# Patient Record
Sex: FEMALE | Race: WHITE | NOT HISPANIC OR LATINO | Employment: STUDENT | URBAN - METROPOLITAN AREA
[De-identification: names, ages, dates, MRNs, and addresses within clinical notes are randomized per-mention and may not be internally consistent; named-entity substitution may affect disease eponyms.]

---

## 2023-06-19 PROBLEM — M08.00 JUVENILE RHEUMATOID ARTHRITIS (MULTI): Status: ACTIVE | Noted: 2023-06-19

## 2023-06-19 PROBLEM — J30.9 ALLERGIC RHINITIS: Status: ACTIVE | Noted: 2023-06-19

## 2023-06-19 PROBLEM — M25.552 LEFT HIP PAIN: Status: ACTIVE | Noted: 2023-06-19

## 2023-06-19 PROBLEM — L73.2 HIDRADENITIS SUPPURATIVA: Status: ACTIVE | Noted: 2023-06-19

## 2023-06-19 PROBLEM — L73.9 FOLLICULITIS OF AXILLA: Status: RESOLVED | Noted: 2023-06-19 | Resolved: 2023-06-19

## 2023-06-19 PROBLEM — L73.9 FOLLICULITIS OF AXILLA: Status: ACTIVE | Noted: 2023-06-19

## 2023-06-19 PROBLEM — N64.89 BREAST ASYMMETRY: Status: ACTIVE | Noted: 2023-06-19

## 2023-06-19 PROBLEM — B35.1 ONYCHOMYCOSIS OF TOENAIL: Status: ACTIVE | Noted: 2023-06-19

## 2023-06-19 PROBLEM — M87.052 AVASCULAR NECROSIS OF BONE OF LEFT HIP (MULTI): Status: ACTIVE | Noted: 2023-06-19

## 2023-06-19 PROBLEM — Z91.018 H/O FOOD ALLERGY: Status: ACTIVE | Noted: 2023-06-19

## 2023-06-19 PROBLEM — R29.898 LEG WEAKNESS: Status: ACTIVE | Noted: 2023-06-19

## 2023-06-19 RX ORDER — CELECOXIB 200 MG/1
200 CAPSULE ORAL 2 TIMES DAILY
COMMUNITY
Start: 2023-02-20 | End: 2023-08-25

## 2023-06-19 RX ORDER — ACETAMINOPHEN 325 MG/1
325 TABLET ORAL EVERY 4 HOURS PRN
COMMUNITY

## 2023-06-19 RX ORDER — DOXYCYCLINE 100 MG/1
1 TABLET ORAL DAILY
COMMUNITY
Start: 2022-08-17 | End: 2023-06-20 | Stop reason: WASHOUT

## 2023-06-19 RX ORDER — DROSPIRENONE, ETHINYL ESTRADIOL AND LEVOMEFOLATE CALCIUM AND LEVOMEFOLATE CALCIUM 3-0.02(24)
1 KIT ORAL DAILY
COMMUNITY
End: 2023-06-23

## 2023-06-19 RX ORDER — MULTIVITAMIN
1 TABLET ORAL DAILY
COMMUNITY

## 2023-06-19 RX ORDER — METHYLPREDNISOLONE 4 MG/1
4 TABLET ORAL ONCE
COMMUNITY
Start: 2022-08-26 | End: 2023-06-20 | Stop reason: WASHOUT

## 2023-06-19 RX ORDER — DICLOFENAC SODIUM 75 MG/1
1 TABLET, DELAYED RELEASE ORAL 2 TIMES DAILY
COMMUNITY
Start: 2022-05-18 | End: 2023-06-20 | Stop reason: WASHOUT

## 2023-06-19 NOTE — PATIENT INSTRUCTIONS
Thank you for choosing [facility] for your healthcare.   As always if you have any questions or concerns please do not hesitate to call our office at [phone] or through [employer].    Have a great day!  Liz Niño, DO    Asc Procedure Text (A): After obtaining clear surgical margins the patient was sent to an ASC for surgical repair.  The patient understands they will receive post-surgical care and follow-up from the ASC physician.

## 2023-06-19 NOTE — PROGRESS NOTES
"Subjective   Patient ID: Yessi Moore is a 22 y.o. female who presents for Follow-up (Hip replacement in Feb) and Annual Exam.  HPI    In addition to that documented in the HPI above, the additional ROS was obtained:  Constitutional: Denies fevers or chills  Eyes: Denies vision changes  ENMT: Denies trouble swallowing  Cardiovascular: Denies chest pain or heart racing  Respiratory: Denies SOB or cough  Gastrointestinal: Denies nausea, vomiting, diarrhea or constipation      Current Outpatient Medications   Medication Sig Dispense Refill    acetaminophen (Tylenol) 325 mg tablet Take 1 tablet (325 mg) by mouth every 4 hours if needed for mild pain (1 - 3) or moderate pain (4 - 6).      ASCORBIC ACID, VITAMIN C, ORAL Take by mouth.      celecoxib (CeleBREX) 200 mg capsule Take 1 capsule (200 mg) by mouth twice a day.      CHOLECALCIFEROL, VITAMIN D3, ORAL Take 1 tablet by mouth once daily.      drospirenone-e.estradioL-lm.FA (Tiffani Julio) 3-0.02-0.451 mg (24) (4) Take 1 tablet by mouth once daily.      fish oil concentrate (Omega-3) 120-180 mg capsule Take 1 capsule (1 g) by mouth once daily.      Kevzara 200 mg/1.14 mL injection Inject 1.14 mL (200 mg) under the skin every 14 (fourteen) days.      multivitamin tablet Take 1 tablet by mouth once daily.       No current facility-administered medications for this visit.       Objective     Visit Vitals  /74 (BP Location: Right arm, Patient Position: Sitting, BP Cuff Size: Adult)   Pulse 76   Temp 35.9 °C (96.7 °F) (Temporal)   Resp 16   Ht 1.664 m (5' 5.5\")   Wt 68 kg (150 lb)   SpO2 99%   BMI 24.58 kg/m²   Smoking Status Never   BSA 1.77 m²        Constitutional: Well nourished, well developed, appears stated age  Eyes: no scleral icterus, no conjunctival injection  Ears: normal external auditory canal, no retraction or bulging of tympanic membranes  Neck: no thyromegaly  Cardiovascular: regular rate and rhythm, no leg edema  Respiratory: normal respiratory " effort, clear to auscultation bilaterally  Musculoskeletal: No gross deformities appreciated  Skin: Warm, dry. No rashes  Neurologic: Alert, CNs II-XII grossly intact..  Psych: Appropriate mood and affect.        Assessment/Plan   Problem List Items Addressed This Visit       Juvenile rheumatoid arthritis (CMS/HCC)     Started on Kevzara biweekly injections by rheumatology          Avascular necrosis of bone of left hip (CMS/HCC)     S/p hip replacement in February  Fully recovered          Other Visit Diagnoses       Annual physical exam    -  Primary    Preventative care up to date          Follow up yearly and prn.    Virginia Factor, DO

## 2023-06-20 ENCOUNTER — OFFICE VISIT (OUTPATIENT)
Dept: PRIMARY CARE | Facility: CLINIC | Age: 23
End: 2023-06-20
Payer: COMMERCIAL

## 2023-06-20 VITALS
DIASTOLIC BLOOD PRESSURE: 74 MMHG | HEIGHT: 66 IN | WEIGHT: 150 LBS | HEART RATE: 76 BPM | OXYGEN SATURATION: 99 % | SYSTOLIC BLOOD PRESSURE: 122 MMHG | TEMPERATURE: 96.7 F | RESPIRATION RATE: 16 BRPM | BODY MASS INDEX: 24.11 KG/M2

## 2023-06-20 DIAGNOSIS — M08.00 JUVENILE RHEUMATOID ARTHRITIS (MULTI): ICD-10-CM

## 2023-06-20 DIAGNOSIS — Z00.00 ANNUAL PHYSICAL EXAM: Primary | ICD-10-CM

## 2023-06-20 DIAGNOSIS — M87.052 AVASCULAR NECROSIS OF BONE OF LEFT HIP (MULTI): ICD-10-CM

## 2023-06-20 PROBLEM — Z96.642 S/P TOTAL LEFT HIP ARTHROPLASTY: Status: ACTIVE | Noted: 2023-02-14

## 2023-06-20 PROBLEM — R29.898 LEG WEAKNESS: Status: RESOLVED | Noted: 2023-06-19 | Resolved: 2023-06-20

## 2023-06-20 PROBLEM — M25.552 LEFT HIP PAIN: Status: RESOLVED | Noted: 2023-06-19 | Resolved: 2023-06-20

## 2023-06-20 PROCEDURE — 1036F TOBACCO NON-USER: CPT | Performed by: FAMILY MEDICINE

## 2023-06-20 PROCEDURE — 99395 PREV VISIT EST AGE 18-39: CPT | Performed by: FAMILY MEDICINE

## 2023-06-20 RX ORDER — SARILUMAB 200 MG/1.14ML
200 INJECTION, SOLUTION SUBCUTANEOUS
COMMUNITY
Start: 2023-06-13

## 2023-06-20 RX ORDER — ACETAMINOPHEN 500 MG
1000 TABLET ORAL 3 TIMES DAILY
COMMUNITY
Start: 2023-02-15 | End: 2023-06-20 | Stop reason: WASHOUT

## 2023-06-20 RX ORDER — TRETINOIN 0.5 MG/G
CREAM TOPICAL
COMMUNITY
Start: 2019-08-22 | End: 2023-06-20 | Stop reason: WASHOUT

## 2023-06-20 SDOH — ECONOMIC STABILITY: FOOD INSECURITY: WITHIN THE PAST 12 MONTHS, THE FOOD YOU BOUGHT JUST DIDN'T LAST AND YOU DIDN'T HAVE MONEY TO GET MORE.: NEVER TRUE

## 2023-06-20 SDOH — ECONOMIC STABILITY: FOOD INSECURITY: WITHIN THE PAST 12 MONTHS, YOU WORRIED THAT YOUR FOOD WOULD RUN OUT BEFORE YOU GOT MONEY TO BUY MORE.: NEVER TRUE

## 2023-06-20 ASSESSMENT — PATIENT HEALTH QUESTIONNAIRE - PHQ9
SUM OF ALL RESPONSES TO PHQ9 QUESTIONS 1 & 2: 0
2. FEELING DOWN, DEPRESSED OR HOPELESS: NOT AT ALL
1. LITTLE INTEREST OR PLEASURE IN DOING THINGS: NOT AT ALL

## 2023-06-20 ASSESSMENT — LIFESTYLE VARIABLES
SKIP TO QUESTIONS 9-10: 1
HOW MANY STANDARD DRINKS CONTAINING ALCOHOL DO YOU HAVE ON A TYPICAL DAY: PATIENT DOES NOT DRINK
HOW OFTEN DO YOU HAVE SIX OR MORE DRINKS ON ONE OCCASION: NEVER
AUDIT-C TOTAL SCORE: 0
HOW OFTEN DO YOU HAVE A DRINK CONTAINING ALCOHOL: NEVER

## 2023-06-20 ASSESSMENT — PAIN SCALES - GENERAL: PAINLEVEL: 0-NO PAIN

## 2023-08-01 LAB
CHLAMYDIA TRACH., AMPLIFIED: NEGATIVE
N. GONORRHEA, AMPLIFIED: NEGATIVE

## 2023-08-25 DIAGNOSIS — M08.00: ICD-10-CM

## 2023-08-25 PROBLEM — L73.9 FOLLICULITIS OF AXILLA: Status: ACTIVE | Noted: 2023-08-25

## 2023-08-25 PROBLEM — L30.9 ECZEMA: Status: ACTIVE | Noted: 2023-08-25

## 2023-08-25 RX ORDER — CELECOXIB 200 MG/1
200 CAPSULE ORAL 2 TIMES DAILY
Qty: 180 CAPSULE | Refills: 1 | Status: SHIPPED | OUTPATIENT
Start: 2023-08-25 | End: 2024-03-04

## 2024-03-04 DIAGNOSIS — M08.00: ICD-10-CM

## 2024-03-04 RX ORDER — CELECOXIB 200 MG/1
200 CAPSULE ORAL 2 TIMES DAILY
Qty: 180 CAPSULE | Refills: 1 | Status: SHIPPED | OUTPATIENT
Start: 2024-03-04

## 2024-06-24 ENCOUNTER — APPOINTMENT (OUTPATIENT)
Dept: PRIMARY CARE | Facility: CLINIC | Age: 24
End: 2024-06-24
Payer: COMMERCIAL

## 2024-07-10 ENCOUNTER — APPOINTMENT (OUTPATIENT)
Dept: PRIMARY CARE | Facility: CLINIC | Age: 24
End: 2024-07-10
Payer: COMMERCIAL

## 2024-07-24 ENCOUNTER — APPOINTMENT (OUTPATIENT)
Dept: PRIMARY CARE | Facility: CLINIC | Age: 24
End: 2024-07-24
Payer: COMMERCIAL

## 2024-07-24 VITALS
WEIGHT: 156 LBS | HEART RATE: 74 BPM | HEIGHT: 65 IN | DIASTOLIC BLOOD PRESSURE: 73 MMHG | BODY MASS INDEX: 25.99 KG/M2 | OXYGEN SATURATION: 97 % | SYSTOLIC BLOOD PRESSURE: 105 MMHG | TEMPERATURE: 96.8 F | RESPIRATION RATE: 18 BRPM

## 2024-07-24 DIAGNOSIS — Z00.00 ANNUAL PHYSICAL EXAM: Primary | ICD-10-CM

## 2024-07-24 PROCEDURE — 1036F TOBACCO NON-USER: CPT | Performed by: FAMILY MEDICINE

## 2024-07-24 PROCEDURE — 3008F BODY MASS INDEX DOCD: CPT | Performed by: FAMILY MEDICINE

## 2024-07-24 PROCEDURE — 99395 PREV VISIT EST AGE 18-39: CPT | Performed by: FAMILY MEDICINE

## 2024-07-24 RX ORDER — ETANERCEPT 50 MG/ML
50 SOLUTION SUBCUTANEOUS
COMMUNITY
Start: 2024-04-26

## 2024-07-24 SDOH — ECONOMIC STABILITY: FOOD INSECURITY: WITHIN THE PAST 12 MONTHS, THE FOOD YOU BOUGHT JUST DIDN'T LAST AND YOU DIDN'T HAVE MONEY TO GET MORE.: NEVER TRUE

## 2024-07-24 SDOH — ECONOMIC STABILITY: FOOD INSECURITY: WITHIN THE PAST 12 MONTHS, YOU WORRIED THAT YOUR FOOD WOULD RUN OUT BEFORE YOU GOT MONEY TO BUY MORE.: NEVER TRUE

## 2024-07-24 ASSESSMENT — LIFESTYLE VARIABLES
AUDIT-C TOTAL SCORE: 0
SKIP TO QUESTIONS 9-10: 1
HOW MANY STANDARD DRINKS CONTAINING ALCOHOL DO YOU HAVE ON A TYPICAL DAY: PATIENT DOES NOT DRINK
HOW OFTEN DO YOU HAVE SIX OR MORE DRINKS ON ONE OCCASION: NEVER
HOW OFTEN DO YOU HAVE A DRINK CONTAINING ALCOHOL: NEVER

## 2024-07-24 ASSESSMENT — PATIENT HEALTH QUESTIONNAIRE - PHQ9
SUM OF ALL RESPONSES TO PHQ9 QUESTIONS 1 & 2: 0
1. LITTLE INTEREST OR PLEASURE IN DOING THINGS: NOT AT ALL
2. FEELING DOWN, DEPRESSED OR HOPELESS: NOT AT ALL

## 2024-07-24 ASSESSMENT — ENCOUNTER SYMPTOMS
OCCASIONAL FEELINGS OF UNSTEADINESS: 0
DEPRESSION: 0
LOSS OF SENSATION IN FEET: 0

## 2024-07-24 ASSESSMENT — PAIN SCALES - GENERAL: PAINLEVEL: 0-NO PAIN

## 2024-07-24 NOTE — PATIENT INSTRUCTIONS
Thank you for choosing [facility] for your healthcare.   As always if you have any questions or concerns please do not hesitate to call our office at [phone] or through [employer].    Have a great day!  Liz Niño, DO

## 2024-07-24 NOTE — PROGRESS NOTES
"Subjective   Patient ID: Yessi Moore is a 24 y.o. female who presents for Annual Exam.  Was switched to Enbrel from rheumatology a few months ago and is doing well. Has not had recurrent tonsillitis like she was on previous medication.         In addition to that documented in the HPI above, the additional ROS was obtained:  Constitutional: Denies fevers or chills  Eyes: Denies vision changes  ENMT: Denies trouble swallowing  Cardiovascular: Denies chest pain or heart racing  Respiratory: Denies SOB or cough  Gastrointestinal: Denies nausea, vomiting, diarrhea or constipation      Patient Care Team:  Liz Niño DO as PCP - General  Liz Niño DO as PCP - [facility] PCP  Jorge A Martini MD as Consulting Physician (Orthopaedic Surgery)  Karina Thompson MD as Consulting Physician (Rheumatology)    Current Outpatient Medications   Medication Sig Dispense Refill    acetaminophen (Tylenol) 325 mg tablet Take 1 tablet (325 mg) by mouth every 4 hours if needed for mild pain (1 - 3) or moderate pain (4 - 6).      ASCORBIC ACID, VITAMIN C, ORAL Take by mouth.      celecoxib (CeleBREX) 200 mg capsule TAKE 1 CAPSULE BY MOUTH TWICE A  capsule 1    CHOLECALCIFEROL, VITAMIN D3, ORAL Take 1 tablet by mouth once daily.      drospirenone-e.estradioL-lm.FA (Tiffani Julio) 3-0.02-0.451 mg (24) (4) Take 1 tablet by mouth once daily.      EnbreL SureClick 50 mg/mL (1 mL) pen injector pen injection Inject 1 mL (50 mg) under the skin 1 (one) time per week.      fish oil concentrate (Omega-3) 120-180 mg capsule Take 1 capsule (1 g) by mouth once daily.      multivitamin tablet Take 1 tablet by mouth once daily.       No current facility-administered medications for this visit.       Objective     Visit Vitals  /73 (BP Location: Right arm, Patient Position: Sitting, BP Cuff Size: Adult)   Pulse 74   Temp 36 °C (96.8 °F) (Temporal)   Resp 18   Ht 1.651 m (5' 5\")   Wt 70.8 kg (156 lb)   SpO2 97%   BMI 25.96 kg/m² "   Smoking Status Never   BSA 1.8 m²        Constitutional: Well nourished, well developed, appears stated age  Eyes: no scleral icterus, no conjunctival injection  Ears: normal external auditory canal, no retraction or bulging of tympanic membranes  Neck: no thyromegaly  Cardiovascular: regular rate and rhythm, no leg edema  Respiratory: normal respiratory effort, clear to auscultation bilaterally  Skin: Warm, dry. No rashes  Neurologic: Alert, CNs II-XII grossly intact..  Psych: Appropriate mood and affect.        Assessment/Plan   Problem List Items Addressed This Visit    None  Visit Diagnoses       Annual physical exam    -  Primary    declined Tdap and HPV vaccines   screening labs done previously          Follow up yearly and prn. Going back to school in the fall to get her masters.     Liz Niño, DO

## 2024-08-07 ENCOUNTER — APPOINTMENT (OUTPATIENT)
Dept: OBSTETRICS AND GYNECOLOGY | Facility: CLINIC | Age: 24
End: 2024-08-07
Payer: COMMERCIAL

## 2024-08-07 VITALS — WEIGHT: 155 LBS | DIASTOLIC BLOOD PRESSURE: 78 MMHG | BODY MASS INDEX: 25.79 KG/M2 | SYSTOLIC BLOOD PRESSURE: 100 MMHG

## 2024-08-07 DIAGNOSIS — Z11.3 SCREEN FOR STD (SEXUALLY TRANSMITTED DISEASE): ICD-10-CM

## 2024-08-07 DIAGNOSIS — Z12.4 SCREENING FOR MALIGNANT NEOPLASM OF CERVIX: ICD-10-CM

## 2024-08-07 DIAGNOSIS — Z30.41 ENCOUNTER FOR SURVEILLANCE OF CONTRACEPTIVE PILLS: ICD-10-CM

## 2024-08-07 DIAGNOSIS — Z01.419 ENCOUNTER FOR WELL WOMAN EXAM WITH ROUTINE GYNECOLOGICAL EXAM: Primary | ICD-10-CM

## 2024-08-07 PROCEDURE — 87491 CHLMYD TRACH DNA AMP PROBE: CPT

## 2024-08-07 PROCEDURE — 87591 N.GONORRHOEAE DNA AMP PROB: CPT

## 2024-08-07 PROCEDURE — 99395 PREV VISIT EST AGE 18-39: CPT | Performed by: NURSE PRACTITIONER

## 2024-08-07 PROCEDURE — 88175 CYTOPATH C/V AUTO FLUID REDO: CPT

## 2024-08-07 PROCEDURE — 1036F TOBACCO NON-USER: CPT | Performed by: NURSE PRACTITIONER

## 2024-08-07 PROCEDURE — 87661 TRICHOMONAS VAGINALIS AMPLIF: CPT

## 2024-08-07 RX ORDER — DROSPIRENONE, ETHINYL ESTRADIOL AND LEVOMEFOLATE CALCIUM AND LEVOMEFOLATE CALCIUM 3-0.02(24)
1 KIT ORAL DAILY
Qty: 84 TABLET | Refills: 3 | Status: SHIPPED | OUTPATIENT
Start: 2024-08-07

## 2024-08-07 NOTE — PROGRESS NOTES
HPI:   Yessi Moore is a 24 y.o. who presents today for her annual gynecologic exam without complaints    She has the following concerns; None. Doing well. Needs refills of her ocp. Periods are well managed on this pill. Due for PAP.     GYN HISTORY:  Periods are regular every 28-30 days, lasting 4 days.   Dysmenorrhea:none. Cyclic symptoms include none.   No intermenstrual bleeding, spotting, or discharge.    Current contraception: OCP (estrogen/progesterone)      Requests STD testing: yes -        PAP History   Last pap:   2023 ASCUS HPV Not done  History of abnormal pap: yes - hx of ASCUS  HPV vaccine: no  @paphx@    Health Screening  Family history of breast, uterine, ovarian or colon cancer: no         The patient feels safe at home.         Review of Systems:   Constitutional: no fever and no chills.  Cardiovascular: no chest pain.   Respiratory: no shortness of breath.   Gastrointestinal: no nausea, no abdominal pain and no constipation  Genitourinary: no dysuria, no urinary incontinence, no vaginal dryness, no pelvic pain and no vaginal discharge.   Neurological: no headache.  Psychiatric: no anxiety and no depression.              Objective         /78   Wt 70.3 kg (155 lb)   LMP 07/10/2024   BMI 25.79 kg/m²         Physical Exam:   Constitutional: Alert and in no acute distress. Well developed, well nourished.      Neck: No neck asymmetry. Supple. Thyroid not enlarged and there were no palpable thyroid nodules.      Cardiovascular: Heart rate and rhythm were normal, normal S1 and S2, no gallops, and no murmurs.      Pulmonary: No respiratory distress. Clear bilateral breath sounds.      Chest: Breasts: Normal appearance, no nipple discharge and no skin changes. Palpation of breasts and axillae: No palpable mass and no axillary lymphadenopathy.      Abdomen: Soft nontender; no abdominal mass palpated. Normal bowel sounds. No organomegaly.      Genitourinary:   - External genitalia: Normal.    - Palpation of lymph nodes in groin: No inguinal lymphadenopathy.   - Bartholin's Urethral and Skenes Glands: Normal.   - Urethra: Normal.    -Bladder: Normal on palpation.   - Vagina: Normal.   - Cervix: Normal.   - Uterus: Normal. Right Adnexa/parametria: Normal. Left Adnexa/parametria: Normal.   - Perianal Area: Normal.      Skin: Normal skin color and pigmentation, normal skin turgor, and no rash     Psychiatric: Alert and oriented x 3. Affect normal to patient baseline. Mood: Appropriate.            Assessment/Plan       Diagnoses and all orders for this visit:  Encounter for well woman exam with routine gynecological exam  Here for well woman exam. Doing well. PAP obtained. STD testing obtained.   Encounter for surveillance of contraceptive pills  -     drospirenone-e.estradioL-lm.FA (Tiffani Julio) 3-0.02-0.451 mg (24) (4); Take 1 tablet by mouth once daily.  Screen for STD (sexually transmitted disease)  Via thin prep  Screening for malignant neoplasm of cervix  Thinprep  Follow-up annually; sooner if needed.         ABEL Brooks-CNP

## 2024-08-08 LAB
C TRACH RRNA SPEC QL NAA+PROBE: NEGATIVE
N GONORRHOEA DNA SPEC QL PROBE+SIG AMP: NEGATIVE
T VAGINALIS RRNA SPEC QL NAA+PROBE: NEGATIVE

## 2024-08-20 DIAGNOSIS — N76.0 BV (BACTERIAL VAGINOSIS): Primary | ICD-10-CM

## 2024-08-20 DIAGNOSIS — B96.89 BV (BACTERIAL VAGINOSIS): Primary | ICD-10-CM

## 2024-08-20 LAB
CYTOLOGY CMNT CVX/VAG CYTO-IMP: NORMAL
LAB AP HPV GENOTYPE QUESTION: NO
LAB AP HPV HR: NORMAL
LAB AP PAP ADDITIONAL TESTS: NORMAL
LABORATORY COMMENT REPORT: NORMAL
LMP START DATE: NORMAL
PATH REPORT.TOTAL CANCER: NORMAL

## 2024-08-20 RX ORDER — METRONIDAZOLE 500 MG/1
500 TABLET ORAL 2 TIMES DAILY
Qty: 14 TABLET | Refills: 0 | Status: SHIPPED | OUTPATIENT
Start: 2024-08-20 | End: 2024-08-27

## 2024-12-27 ENCOUNTER — APPOINTMENT (OUTPATIENT)
Dept: OBSTETRICS AND GYNECOLOGY | Facility: CLINIC | Age: 24
End: 2024-12-27
Payer: COMMERCIAL

## 2024-12-27 VITALS — SYSTOLIC BLOOD PRESSURE: 100 MMHG | DIASTOLIC BLOOD PRESSURE: 70 MMHG

## 2024-12-27 DIAGNOSIS — Z32.02 URINE PREGNANCY TEST NEGATIVE: ICD-10-CM

## 2024-12-27 DIAGNOSIS — Z30.41 ENCOUNTER FOR SURVEILLANCE OF CONTRACEPTIVE PILLS: Primary | ICD-10-CM

## 2024-12-27 DIAGNOSIS — Z11.3 SCREENING FOR STD (SEXUALLY TRANSMITTED DISEASE): ICD-10-CM

## 2024-12-27 LAB — PREGNANCY TEST URINE, POC: NEGATIVE

## 2024-12-27 PROCEDURE — 87591 N.GONORRHOEAE DNA AMP PROB: CPT

## 2024-12-27 PROCEDURE — 81025 URINE PREGNANCY TEST: CPT | Performed by: NURSE PRACTITIONER

## 2024-12-27 PROCEDURE — 99213 OFFICE O/P EST LOW 20 MIN: CPT | Performed by: NURSE PRACTITIONER

## 2024-12-27 PROCEDURE — 87491 CHLMYD TRACH DNA AMP PROBE: CPT

## 2024-12-27 RX ORDER — NORETHINDRONE ACETATE AND ETHINYL ESTRADIOL, ETHINYL ESTRADIOL AND FERROUS FUMARATE 1MG-10(24)
1 KIT ORAL DAILY
Qty: 28 TABLET | Refills: 11 | Status: SHIPPED | OUTPATIENT
Start: 2024-12-27 | End: 2025-12-27

## 2024-12-27 NOTE — PROGRESS NOTES
Subjective   Patient ID: Yesis Moore is a 24 y.o. female who presents for Contraception (Reviewing  EMR/Last Annual Exam: 08/07/2024/Negative Pap 2024/).  HPI  Having irregular bleeding on her ocp. Every couple of months she is having bleeding outside of her period, usually the week before she starts. Bleeding can last about a week. Bleeding is light spotting. She is also having more breakouts with her skin. She is interested in trying a different pill.   She desires STD testing today. Will also obtain urine HCG.     Review of Systems   Genitourinary:  Positive for menstrual problem.   All other systems reviewed and are negative.      Objective   Physical Exam  Constitutional:       Appearance: Normal appearance.   Pulmonary:      Effort: Pulmonary effort is normal.   Neurological:      Mental Status: She is alert.   Psychiatric:         Mood and Affect: Mood normal.         Behavior: Behavior normal.       Assessment/Plan   Diagnoses and all orders for this visit:  Encounter for surveillance of contraceptive pills  -     norethindrone-e.estradioL-iron (Lo Loestrin Fe) 1 mg-10 mcg (24)/10 mcg (2) tablet; Take 1 tablet by mouth once daily.  She would like to change her pill to see if this may help regulate her bleeding. STD testing obtained today. Her urine HCG is negative.   Screen for STD  Negative pregnancy test   Follow-up in three months for med check; sooner if needed.     JOHNATHAN Brooks 12/27/24 10:58 AM

## 2024-12-28 LAB
C TRACH RRNA SPEC QL NAA+PROBE: NEGATIVE
N GONORRHOEA DNA SPEC QL PROBE+SIG AMP: NEGATIVE

## 2025-01-24 ENCOUNTER — OFFICE VISIT (OUTPATIENT)
Dept: OBSTETRICS AND GYNECOLOGY | Facility: CLINIC | Age: 25
End: 2025-01-24
Payer: COMMERCIAL

## 2025-01-24 VITALS — DIASTOLIC BLOOD PRESSURE: 70 MMHG | SYSTOLIC BLOOD PRESSURE: 110 MMHG

## 2025-01-24 DIAGNOSIS — Z30.41 ENCOUNTER FOR SURVEILLANCE OF CONTRACEPTIVE PILLS: Primary | ICD-10-CM

## 2025-01-24 PROCEDURE — 99213 OFFICE O/P EST LOW 20 MIN: CPT | Performed by: NURSE PRACTITIONER

## 2025-01-24 RX ORDER — DESOGESTREL AND ETHINYL ESTRADIOL 0.15-0.03
1 KIT ORAL DAILY
Qty: 28 TABLET | Refills: 11 | Status: SHIPPED | OUTPATIENT
Start: 2025-01-24 | End: 2026-01-24

## 2025-01-24 NOTE — PROGRESS NOTES
Subjective   Patient ID: Yessi Moore is a 24 y.o. female who presents for Follow-up (Medication-norethindrone-e.estradioL-iron (Lo Loestrin Fe) 1 mg-10 mcg (24)/10 mcg (2) tablet/Reviewing  EMR/Last Annual Exam: 12/27/2024/Negative Pap 2024).  HPI  Here with c/o hives. She started a new ocp on January 9th. On the 15th she started noticing some spots on her skin. Started on her trunk. Then spread to her chest and neck. Then became itchy. She stopped the pill last night. Benadryl is working okay to give her relief.   Review of Systems    Objective   Physical Exam    Assessment/Plan   {Assess/PlanSmartLinks:38873}         ABEL Brooks-CNP 01/24/25 2:59 PM

## 2025-02-20 ENCOUNTER — APPOINTMENT (OUTPATIENT)
Dept: OBSTETRICS AND GYNECOLOGY | Facility: CLINIC | Age: 25
End: 2025-02-20
Payer: COMMERCIAL

## 2025-04-18 ENCOUNTER — APPOINTMENT (OUTPATIENT)
Dept: OBSTETRICS AND GYNECOLOGY | Facility: CLINIC | Age: 25
End: 2025-04-18
Payer: COMMERCIAL

## 2025-07-23 NOTE — PATIENT INSTRUCTIONS
Thank you for choosing [facility] for your healthcare.   As always if you have any questions or concerns please do not hesitate to call our office at [phone] or through [employer].    Have a great day!  Liz Niño, DO

## 2025-07-24 ENCOUNTER — APPOINTMENT (OUTPATIENT)
Dept: PRIMARY CARE | Facility: CLINIC | Age: 25
End: 2025-07-24
Payer: COMMERCIAL

## 2025-07-24 VITALS
HEIGHT: 65 IN | OXYGEN SATURATION: 99 % | WEIGHT: 154 LBS | HEART RATE: 80 BPM | RESPIRATION RATE: 20 BRPM | TEMPERATURE: 96.8 F | SYSTOLIC BLOOD PRESSURE: 109 MMHG | BODY MASS INDEX: 25.66 KG/M2 | DIASTOLIC BLOOD PRESSURE: 76 MMHG

## 2025-07-24 DIAGNOSIS — L65.9 HAIR LOSS: ICD-10-CM

## 2025-07-24 DIAGNOSIS — Z79.899 OTHER LONG TERM (CURRENT) DRUG THERAPY: ICD-10-CM

## 2025-07-24 DIAGNOSIS — M08.00 JUVENILE RHEUMATOID ARTHRITIS (MULTI): ICD-10-CM

## 2025-07-24 DIAGNOSIS — Z00.00 ANNUAL PHYSICAL EXAM: Primary | ICD-10-CM

## 2025-07-24 PROBLEM — L73.9 FOLLICULITIS OF AXILLA: Status: RESOLVED | Noted: 2023-08-25 | Resolved: 2025-07-24

## 2025-07-24 PROCEDURE — 99395 PREV VISIT EST AGE 18-39: CPT | Performed by: FAMILY MEDICINE

## 2025-07-24 PROCEDURE — 1036F TOBACCO NON-USER: CPT | Performed by: FAMILY MEDICINE

## 2025-07-24 PROCEDURE — 3008F BODY MASS INDEX DOCD: CPT | Performed by: FAMILY MEDICINE

## 2025-07-24 RX ORDER — MULTIVITAMIN/IRON/FOLIC ACID 18MG-0.4MG
1 TABLET ORAL DAILY
COMMUNITY

## 2025-07-24 SDOH — ECONOMIC STABILITY: FOOD INSECURITY: WITHIN THE PAST 12 MONTHS, THE FOOD YOU BOUGHT JUST DIDN'T LAST AND YOU DIDN'T HAVE MONEY TO GET MORE.: NEVER TRUE

## 2025-07-24 SDOH — ECONOMIC STABILITY: FOOD INSECURITY: WITHIN THE PAST 12 MONTHS, YOU WORRIED THAT YOUR FOOD WOULD RUN OUT BEFORE YOU GOT MONEY TO BUY MORE.: NEVER TRUE

## 2025-07-24 ASSESSMENT — PAIN SCALES - GENERAL: PAINLEVEL_OUTOF10: 0-NO PAIN

## 2025-07-24 ASSESSMENT — LIFESTYLE VARIABLES
SKIP TO QUESTIONS 9-10: 1
AUDIT-C TOTAL SCORE: 0
HOW MANY STANDARD DRINKS CONTAINING ALCOHOL DO YOU HAVE ON A TYPICAL DAY: PATIENT DOES NOT DRINK
HOW OFTEN DO YOU HAVE SIX OR MORE DRINKS ON ONE OCCASION: NEVER
HOW OFTEN DO YOU HAVE A DRINK CONTAINING ALCOHOL: NEVER

## 2025-07-24 ASSESSMENT — ENCOUNTER SYMPTOMS
LOSS OF SENSATION IN FEET: 0
DEPRESSION: 0

## 2025-07-24 ASSESSMENT — PATIENT HEALTH QUESTIONNAIRE - PHQ9
2. FEELING DOWN, DEPRESSED OR HOPELESS: NOT AT ALL
1. LITTLE INTEREST OR PLEASURE IN DOING THINGS: NOT AT ALL
SUM OF ALL RESPONSES TO PHQ9 QUESTIONS 1 & 2: 0

## 2025-07-24 NOTE — PROGRESS NOTES
"Subjective   Patient ID: Yessi Moore is a 25 y.o. female who presents for Annual Exam.  She is concerned about poor hair growth, hair falling out from the root, and hair breakage that started around the time she started immunologics for RA.         In addition to that documented in the HPI above, the additional ROS was obtained:  Constitutional: Denies fevers or chills  Eyes: Denies vision changes  ENMT: Denies trouble swallowing  Cardiovascular: Denies chest pain or heart racing  Respiratory: Denies SOB or cough      Patient Care Team:  Liz Niño DO as PCP - General  Liz Niño DO as PCP - [facility] PCP  Jorge A Martini MD as Consulting Physician (Orthopaedic Surgery)  Karina Thompson MD as Consulting Physician (Rheumatology)  ABEL Preston-CNP as Nurse Practitioner (Obstetrics and Gynecology)  Mary Beth Carlson MD as Consulting Physician (Dermatology)    Current Medications[1]    Objective     Visit Vitals  /76 (BP Location: Right arm, Patient Position: Sitting, BP Cuff Size: Adult)   Pulse 80   Temp 36 °C (96.8 °F) (Temporal)   Resp 20   Ht 1.651 m (5' 5\")   Wt 69.9 kg (154 lb)   SpO2 99%   BMI 25.63 kg/m²   Smoking Status Never   BSA 1.79 m²        Constitutional: Well nourished, well developed, appears stated age  Eyes: no scleral icterus, no conjunctival injection  Ears: normal external auditory canal, no retraction or bulging of tympanic membranes  Neck: no thyromegaly  Cardiovascular: regular rate and rhythm, no leg edema  Respiratory: normal respiratory effort, clear to auscultation bilaterally  Musculoskeletal: No gross deformities appreciated  Skin: Warm, dry. No rashes  Neurologic: Alert, CNs II-XII grossly intact..  Psych: Appropriate mood and affect.        Assessment & Plan  Annual physical exam  Has gyn for PAPs  Labs ordered    Orders:    Follow Up In Primary Care - Health Maintenance; Future    Hair loss    Orders:    Testosterone,Free and Total; Future    Ferritin; " Future    Prolactin; Future    DHEA-Sulfate; Future    Thyroid Stimulating Hormone; Future    CBC and Auto Differential; Future    Juvenile rheumatoid arthritis (Multi)  Managed by rheumatology  Has follow up in Sept.  Orders:    Comprehensive Metabolic Panel; Future    Sedimentation Rate; Future    C-Reactive Protein; Future    CBC and Auto Differential; Future    Other long term (current) drug therapy    Orders:    CBC and Auto Differential; Future    Vitamin B12; Future         Follow up yearly and prn. 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    Liz Niño DO         [1]   Current Outpatient Medications   Medication Sig Dispense Refill    acetaminophen (Tylenol) 325 mg tablet Take 1 tablet (325 mg) by mouth every 4 hours if needed for mild pain (1 - 3) or moderate pain (4 - 6).      ASCORBIC ACID, VITAMIN C, ORAL Take by mouth.      b complex 0.4 mg tablet Take 1 tablet by mouth once daily.      CHOLECALCIFEROL, VITAMIN D3, ORAL Take 1 tablet by mouth once daily.      desogestrel-ethinyl estradiol (Enskyce) 0.15-0.03 mg tablet Take 1 tablet by mouth once daily. 28 tablet 11    EnbreL SureClick 50 mg/mL (1 mL) pen injector pen injection Inject 1 mL (50 mg) under the skin 1 (one) time per week.      multivitamin tablet Take 1 tablet by mouth once daily.       No current facility-administered medications for this visit.

## 2025-07-24 NOTE — ASSESSMENT & PLAN NOTE
Managed by rheumatology  Has follow up in Sept.  Orders:    Comprehensive Metabolic Panel; Future    Sedimentation Rate; Future    C-Reactive Protein; Future    CBC and Auto Differential; Future

## 2025-08-03 LAB
ALBUMIN SERPL-MCNC: 4.1 G/DL (ref 3.6–5.1)
ALP SERPL-CCNC: 50 U/L (ref 31–125)
ALT SERPL-CCNC: 9 U/L (ref 6–29)
ANION GAP SERPL CALCULATED.4IONS-SCNC: 10 MMOL/L (CALC) (ref 7–17)
AST SERPL-CCNC: 15 U/L (ref 10–30)
BASOPHILS # BLD AUTO: 62 CELLS/UL (ref 0–200)
BASOPHILS NFR BLD AUTO: 0.7 %
BILIRUB SERPL-MCNC: 0.3 MG/DL (ref 0.2–1.2)
BUN SERPL-MCNC: 12 MG/DL (ref 7–25)
CALCIUM SERPL-MCNC: 9.4 MG/DL (ref 8.6–10.2)
CHLORIDE SERPL-SCNC: 101 MMOL/L (ref 98–110)
CO2 SERPL-SCNC: 25 MMOL/L (ref 20–32)
CREAT SERPL-MCNC: 0.78 MG/DL (ref 0.5–0.96)
CRP SERPL-MCNC: 21.7 MG/L
DHEA-S SERPL-MCNC: 123 MCG/DL (ref 14–349)
EGFRCR SERPLBLD CKD-EPI 2021: 109 ML/MIN/1.73M2
EOSINOPHIL # BLD AUTO: 88 CELLS/UL (ref 15–500)
EOSINOPHIL NFR BLD AUTO: 1 %
ERYTHROCYTE [DISTWIDTH] IN BLOOD BY AUTOMATED COUNT: 11.7 % (ref 11–15)
ERYTHROCYTE [SEDIMENTATION RATE] IN BLOOD BY WESTERGREN METHOD: 34 MM/H
FERRITIN SERPL-MCNC: 44 NG/ML (ref 16–154)
GLUCOSE SERPL-MCNC: 80 MG/DL (ref 65–139)
HCT VFR BLD AUTO: 39.2 % (ref 35–45)
HGB BLD-MCNC: 13 G/DL (ref 11.7–15.5)
LYMPHOCYTES # BLD AUTO: 2983 CELLS/UL (ref 850–3900)
LYMPHOCYTES NFR BLD AUTO: 33.9 %
MCH RBC QN AUTO: 28.8 PG (ref 27–33)
MCHC RBC AUTO-ENTMCNC: 33.2 G/DL (ref 32–36)
MCV RBC AUTO: 86.7 FL (ref 80–100)
MONOCYTES # BLD AUTO: 792 CELLS/UL (ref 200–950)
MONOCYTES NFR BLD AUTO: 9 %
NEUTROPHILS # BLD AUTO: 4875 CELLS/UL (ref 1500–7800)
NEUTROPHILS NFR BLD AUTO: 55.4 %
PLATELET # BLD AUTO: 319 THOUSAND/UL (ref 140–400)
PMV BLD REES-ECKER: 9 FL (ref 7.5–12.5)
POTASSIUM SERPL-SCNC: 4 MMOL/L (ref 3.5–5.3)
PROLACTIN SERPL-MCNC: 3.9 NG/ML
PROT SERPL-MCNC: 7.6 G/DL (ref 6.1–8.1)
RBC # BLD AUTO: 4.52 MILLION/UL (ref 3.8–5.1)
SODIUM SERPL-SCNC: 136 MMOL/L (ref 135–146)
TESTOST FREE SERPL-MCNC: 0.4 PG/ML (ref 0.1–6.4)
TESTOST SERPL-MCNC: 24 NG/DL (ref 2–45)
TSH SERPL-ACNC: 0.68 MIU/L
VIT B12 SERPL-MCNC: 596 PG/ML (ref 200–1100)
WBC # BLD AUTO: 8.8 THOUSAND/UL (ref 3.8–10.8)

## 2025-08-11 ENCOUNTER — APPOINTMENT (OUTPATIENT)
Dept: OBSTETRICS AND GYNECOLOGY | Facility: CLINIC | Age: 25
End: 2025-08-11
Payer: COMMERCIAL

## 2025-08-11 VITALS
BODY MASS INDEX: 23.91 KG/M2 | DIASTOLIC BLOOD PRESSURE: 62 MMHG | WEIGHT: 148.8 LBS | SYSTOLIC BLOOD PRESSURE: 92 MMHG | HEIGHT: 66 IN

## 2025-08-11 DIAGNOSIS — Z11.3 SCREEN FOR STD (SEXUALLY TRANSMITTED DISEASE): ICD-10-CM

## 2025-08-11 DIAGNOSIS — Z01.419 WELL WOMAN EXAM: Primary | ICD-10-CM

## 2025-08-11 PROCEDURE — 1036F TOBACCO NON-USER: CPT | Performed by: STUDENT IN AN ORGANIZED HEALTH CARE EDUCATION/TRAINING PROGRAM

## 2025-08-11 PROCEDURE — 99395 PREV VISIT EST AGE 18-39: CPT | Performed by: STUDENT IN AN ORGANIZED HEALTH CARE EDUCATION/TRAINING PROGRAM

## 2025-08-11 PROCEDURE — 3008F BODY MASS INDEX DOCD: CPT | Performed by: STUDENT IN AN ORGANIZED HEALTH CARE EDUCATION/TRAINING PROGRAM

## 2025-08-12 LAB
C TRACH RRNA SPEC QL NAA+PROBE: NOT DETECTED
N GONORRHOEA RRNA SPEC QL NAA+PROBE: NOT DETECTED
QUEST GC CT AMPLIFIED (ALWAYS MESSAGE): NORMAL
T VAGINALIS RRNA SPEC QL NAA+PROBE: NOT DETECTED

## 2026-07-30 ENCOUNTER — APPOINTMENT (OUTPATIENT)
Dept: PRIMARY CARE | Facility: CLINIC | Age: 26
End: 2026-07-30
Payer: COMMERCIAL

## 2026-08-20 ENCOUNTER — APPOINTMENT (OUTPATIENT)
Dept: OBSTETRICS AND GYNECOLOGY | Facility: CLINIC | Age: 26
End: 2026-08-20
Payer: COMMERCIAL